# Patient Record
Sex: FEMALE | Race: WHITE | NOT HISPANIC OR LATINO | Employment: PART TIME | URBAN - METROPOLITAN AREA
[De-identification: names, ages, dates, MRNs, and addresses within clinical notes are randomized per-mention and may not be internally consistent; named-entity substitution may affect disease eponyms.]

---

## 2020-04-23 DIAGNOSIS — Z01.84 ANTIBODY RESPONSE EXAMINATION: ICD-10-CM

## 2020-05-23 DIAGNOSIS — Z01.84 ANTIBODY RESPONSE EXAMINATION: ICD-10-CM

## 2020-06-22 DIAGNOSIS — Z01.84 ANTIBODY RESPONSE EXAMINATION: ICD-10-CM

## 2020-07-22 DIAGNOSIS — Z01.84 ANTIBODY RESPONSE EXAMINATION: ICD-10-CM

## 2020-08-21 DIAGNOSIS — Z01.84 ANTIBODY RESPONSE EXAMINATION: ICD-10-CM

## 2020-09-20 DIAGNOSIS — Z01.84 ANTIBODY RESPONSE EXAMINATION: ICD-10-CM

## 2020-10-20 DIAGNOSIS — Z01.84 ANTIBODY RESPONSE EXAMINATION: ICD-10-CM

## 2020-11-19 DIAGNOSIS — Z01.84 ANTIBODY RESPONSE EXAMINATION: ICD-10-CM

## 2021-05-04 ENCOUNTER — OFFICE VISIT (OUTPATIENT)
Dept: FAMILY MEDICINE | Facility: CLINIC | Age: 51
End: 2021-05-04

## 2021-05-04 VITALS
BODY MASS INDEX: 31.99 KG/M2 | OXYGEN SATURATION: 98 % | HEIGHT: 61 IN | RESPIRATION RATE: 16 BRPM | WEIGHT: 169.44 LBS | DIASTOLIC BLOOD PRESSURE: 64 MMHG | HEART RATE: 66 BPM | SYSTOLIC BLOOD PRESSURE: 94 MMHG

## 2021-05-04 DIAGNOSIS — Z02.89 ENCOUNTER FOR PHYSICAL EXAMINATION RELATED TO EMPLOYMENT: Primary | ICD-10-CM

## 2021-05-04 PROCEDURE — 99386 PR PREVENTIVE VISIT,NEW,40-64: ICD-10-PCS | Mod: ,,, | Performed by: FAMILY MEDICINE

## 2021-05-04 PROCEDURE — 99386 PREV VISIT NEW AGE 40-64: CPT | Mod: ,,, | Performed by: FAMILY MEDICINE

## 2021-05-04 PROCEDURE — 99999 PR PBB SHADOW E&M-EST. PATIENT-LVL III: CPT | Mod: PBBFAC,,, | Performed by: FAMILY MEDICINE

## 2021-05-04 PROCEDURE — 99999 PR PBB SHADOW E&M-EST. PATIENT-LVL III: ICD-10-PCS | Mod: PBBFAC,,, | Performed by: FAMILY MEDICINE

## 2021-05-04 RX ORDER — IBUPROFEN 100 MG/5ML
1000 SUSPENSION, ORAL (FINAL DOSE FORM) ORAL DAILY
COMMUNITY

## 2021-05-04 RX ORDER — CHOLECALCIFEROL (VITAMIN D3) 25 MCG
1000 TABLET ORAL DAILY
COMMUNITY

## 2021-05-04 RX ORDER — GLUCOSAMINE SULFATE 500 MG
TABLET ORAL
COMMUNITY

## 2021-05-07 ENCOUNTER — TELEPHONE (OUTPATIENT)
Dept: FAMILY MEDICINE | Facility: CLINIC | Age: 51
End: 2021-05-07

## 2022-01-06 ENCOUNTER — TELEPHONE (OUTPATIENT)
Dept: FAMILY MEDICINE | Facility: CLINIC | Age: 52
End: 2022-01-06

## 2022-01-06 ENCOUNTER — PATIENT MESSAGE (OUTPATIENT)
Dept: FAMILY MEDICINE | Facility: CLINIC | Age: 52
End: 2022-01-06

## 2022-01-06 ENCOUNTER — OFFICE VISIT (OUTPATIENT)
Dept: FAMILY MEDICINE | Facility: CLINIC | Age: 52
End: 2022-01-06
Payer: OTHER GOVERNMENT

## 2022-01-06 VITALS
HEIGHT: 61 IN | WEIGHT: 172.94 LBS | HEART RATE: 83 BPM | OXYGEN SATURATION: 95 % | DIASTOLIC BLOOD PRESSURE: 64 MMHG | BODY MASS INDEX: 32.65 KG/M2 | SYSTOLIC BLOOD PRESSURE: 96 MMHG

## 2022-01-06 DIAGNOSIS — M79.604 RIGHT LEG PAIN: ICD-10-CM

## 2022-01-06 DIAGNOSIS — R73.9 HYPERGLYCEMIA: ICD-10-CM

## 2022-01-06 DIAGNOSIS — Z12.4 PAP SMEAR FOR CERVICAL CANCER SCREENING: ICD-10-CM

## 2022-01-06 DIAGNOSIS — R73.9 HYPERGLYCEMIA: Primary | ICD-10-CM

## 2022-01-06 DIAGNOSIS — Z00.00 ENCOUNTER FOR MEDICAL EXAMINATION TO ESTABLISH CARE: Primary | ICD-10-CM

## 2022-01-06 DIAGNOSIS — U07.1 COVID: ICD-10-CM

## 2022-01-06 LAB
CTP QC/QA: YES
CTP QC/QA: YES
FLUAV AG NPH QL: NEGATIVE
FLUBV AG NPH QL: NEGATIVE
SARS-COV-2 RDRP RESP QL NAA+PROBE: POSITIVE

## 2022-01-06 PROCEDURE — 99214 OFFICE O/P EST MOD 30 MIN: CPT | Mod: PBBFAC,PN | Performed by: FAMILY MEDICINE

## 2022-01-06 PROCEDURE — 99999 PR PBB SHADOW E&M-EST. PATIENT-LVL IV: ICD-10-PCS | Mod: PBBFAC,,, | Performed by: FAMILY MEDICINE

## 2022-01-06 PROCEDURE — 99215 PR OFFICE/OUTPT VISIT, EST, LEVL V, 40-54 MIN: ICD-10-PCS | Mod: S$PBB,,, | Performed by: FAMILY MEDICINE

## 2022-01-06 PROCEDURE — U0002 COVID-19 LAB TEST NON-CDC: HCPCS | Mod: PBBFAC,PN | Performed by: FAMILY MEDICINE

## 2022-01-06 PROCEDURE — 99215 OFFICE O/P EST HI 40 MIN: CPT | Mod: S$PBB,,, | Performed by: FAMILY MEDICINE

## 2022-01-06 PROCEDURE — 99999 PR PBB SHADOW E&M-EST. PATIENT-LVL IV: CPT | Mod: PBBFAC,,, | Performed by: FAMILY MEDICINE

## 2022-01-06 PROCEDURE — 87804 INFLUENZA ASSAY W/OPTIC: CPT | Mod: PBBFAC,PN | Performed by: FAMILY MEDICINE

## 2022-01-06 RX ORDER — LANCETS
EACH MISCELLANEOUS
Qty: 200 EACH | Refills: 1 | Status: SHIPPED | OUTPATIENT
Start: 2022-01-06

## 2022-01-06 RX ORDER — ONDANSETRON 8 MG/1
TABLET, ORALLY DISINTEGRATING ORAL
COMMUNITY
End: 2022-08-02

## 2022-01-06 RX ORDER — INSULIN PUMP SYRINGE, 3 ML
EACH MISCELLANEOUS
Qty: 1 EACH | Refills: 0 | Status: SHIPPED | OUTPATIENT
Start: 2022-01-06 | End: 2023-05-29

## 2022-01-06 RX ORDER — ALBUTEROL SULFATE 90 UG/1
AEROSOL, METERED RESPIRATORY (INHALATION)
COMMUNITY
Start: 2021-11-02

## 2022-01-06 NOTE — TELEPHONE ENCOUNTER
----- Message from Poornima Macdonald sent at 1/5/2022  4:43 PM CST -----  .Type: Needs Medical Advice  Who Called:  Pt  Symptoms (please be specific): Covid/flu like symptoms  How long has patient had these symptoms: 1/4  Best Call Back Number: [phone]   Additional Information: Pt requesting to speak with a nurse to inform that she is having some covid or flu symptoms, need to know if she can still come in to her appt.  Please call to advise.  Thanks

## 2022-01-06 NOTE — ASSESSMENT & PLAN NOTE
- COVID positive, flu negative  - staff to call patient with results, patient did not want to wait for results in office  - discussed quarantine protocol with patient before she left office if COVID positive

## 2022-01-06 NOTE — PROGRESS NOTES
Subjective:       Patient ID: Joselin Steinberg is a 51 y.o. female.    Chief Complaint: Fatigue and Generalized Body Aches    50 y/o F with PMH of Asthma, gastric bypass, and OA presents to establish care, patient does not have a PCP. Patient is new to me today. Patient complains of body aches, fatigue, congestion, sore throat, fever of 103.7 two days ago and repeat of 100.7 for which she took ibuprofen. Patient works as a nurse. States she thinks she has COVID and would like to undergo a flu and COVID test today. Patient would also like labs drawn and to be checked for diabetes as she can feel when her blood sugar is too high and she starts to have headaches and blurry vision, she has been diabetic in the past but does not want to take medication at this time. Has a glucometer at home and she checks her glucose with ranges of 117-307, requested glucometer strips today. She is motivated to control it with diet and exercise. Currently does not exercise states she eats increased proteins and salads but sweet tea is her downfall and she is trying to stop drinking it. Patient also states she thinks she has a bakers cyst behind her right knee and would like a d-dimer and ultrasound for DVT checked. Patient denies COVID, flu and shingles vaccine today. States she underwent colonoscopy 1.5 years ago with return in 10 years. Patient is requesting referral to OBGYN and hormone testing today stating that she knows she is going through menopause, her periods are lasting 2.5 weeks. Patient has not undergone PAP smear in >5 years, mammogram was performed last year. Patient would also like us to check her pancrease and she states she is having mid epigastric pain that radiates to her left side under her rib and she feels it is her pancreas. Patient does not have a gallbladder. She would also like her cholesterol checked. Discussed we will additionally perform basic labs and care gap labs today.        Current Outpatient Medications:      albuterol (PROVENTIL/VENTOLIN HFA) 90 mcg/actuation inhaler, , Disp: , Rfl:     ascorbic acid, vitamin C, (VITAMIN C) 1000 MG tablet, Take 1,000 mg by mouth once daily., Disp: , Rfl:     lysine 1,000 mg Tab, Take by mouth., Disp: , Rfl:     multivitamin with minerals tablet, , Disp: , Rfl:     QUERCETIN DIHYDRATE, BULK, MISC, by Misc.(Non-Drug; Combo Route) route., Disp: , Rfl:     vitamin D (VITAMIN D3) 1000 units Tab, Take 1,000 Units by mouth once daily., Disp: , Rfl:     zinc acetate 25 mg (zinc) Cap, Take by mouth., Disp: , Rfl:     blood sugar diagnostic Strp, 1 strip by Misc.(Non-Drug; Combo Route) route 2 (two) times daily., Disp: 200 strip, Rfl: 1    ondansetron (ZOFRAN-ODT) 8 MG TbDL, Zofran ODT 8 mg disintegrating tablet  Place 1 tablet every 8 hours by translingual route., Disp: , Rfl:     Review of patient's allergies indicates:  No Known Allergies    Past Medical History:   Diagnosis Date    Asthma        Past Surgical History:   Procedure Laterality Date     SECTION  1996    CHOLECYSTECTOMY      GASTRIC BYPASS  03/10/2010    TUBAL LIGATION  05/15/1996    Approximately       Family History   Problem Relation Age of Onset    Alcohol abuse Maternal Grandmother     Alcohol abuse Brother     Asthma Son     Diabetes Mother     Early death Mother     Lung cancer Mother     Drug abuse Brother        Social History     Tobacco Use    Smoking status: Never Smoker   Substance Use Topics    Alcohol use: Never    Drug use: Never       Review of Systems   Constitutional: Positive for fatigue and fever. Negative for activity change, appetite change and unexpected weight change.   HENT: Positive for nasal congestion and sore throat. Negative for ear discharge, ear pain, hearing loss and tinnitus.    Eyes: Negative for photophobia, pain and visual disturbance.   Respiratory: Negative for cough, shortness of breath and wheezing.    Cardiovascular: Negative for chest pain and  "palpitations.   Gastrointestinal: Positive for abdominal pain (middle of chest to left side). Negative for blood in stool, constipation, diarrhea, nausea and vomiting.   Genitourinary: Positive for menstrual irregularity. Negative for dysuria and hematuria.   Musculoskeletal: Positive for myalgias.        "bakers cyst" at back of right leg   Neurological: Positive for headaches. Negative for weakness.           Objective:      Vitals:    01/06/22 1107   BP: 96/64   BP Location: Right arm   Patient Position: Sitting   BP Method: Medium (Automatic)   Pulse: 83   SpO2: 95%   Weight: 78.4 kg (172 lb 15.2 oz)   Height: 5' 1" (1.549 m)     Physical Exam  Constitutional:       Appearance: Normal appearance.   HENT:      Head: Normocephalic.      Right Ear: Tympanic membrane, ear canal and external ear normal.      Left Ear: Tympanic membrane, ear canal and external ear normal.      Nose: Nose normal.      Mouth/Throat:      Mouth: Mucous membranes are moist.      Pharynx: Oropharynx is clear. No oropharyngeal exudate or posterior oropharyngeal erythema.   Eyes:      Pupils: Pupils are equal, round, and reactive to light.   Cardiovascular:      Rate and Rhythm: Normal rate and regular rhythm.      Pulses: Normal pulses.      Heart sounds: Normal heart sounds.   Pulmonary:      Effort: Pulmonary effort is normal.      Breath sounds: Normal breath sounds.   Abdominal:      General: Bowel sounds are normal.      Palpations: Abdomen is soft.      Tenderness: There is no abdominal tenderness (no tenderness to palpation noted in midepigastric region/left upperquadrant). There is no guarding or rebound.   Musculoskeletal:         General: Normal range of motion.      Cervical back: Normal range of motion and neck supple. No tenderness.      Comments: No bakers cyst noted a right popliteal fossa   Lymphadenopathy:      Cervical: No cervical adenopathy.   Skin:     General: Skin is warm and dry.   Neurological:      General: No " focal deficit present.      Mental Status: She is alert and oriented to person, place, and time.   Psychiatric:         Mood and Affect: Mood normal.         Behavior: Behavior normal.           No results found for: WBC, HGB, HCT, PLT, CHOL, TRIG, HDL, LDLDIRECT, ALT, AST, NA, K, CL, CREATININE, BUN, CO2, TSH, PSA, INR, GLUF, HGBA1C, MICROALBUR   Assessment:       1. Encounter for medical examination to establish care    2. Pap smear for cervical cancer screening    3. Right leg pain    4. COVID    5. Hyperglycemia        Plan:         Problem List Items Addressed This Visit        Renal/    Pap smear for cervical cancer screening    Relevant Orders    Ambulatory referral/consult to Obstetrics / Gynecology    Ambulatory referral/consult to Obstetrics / Gynecology       Endocrine    Hyperglycemia     - continue to monitor blood glucose at home  - implement strict diet and exercise regimen over the next three months  -150 minutes/week of exercise  - will recheck labs in 3 months if no improvement medication may be warranted          Relevant Medications    blood sugar diagnostic Strp       Orthopedic    Right leg pain    Relevant Orders    D dimer, quantitative    CV Ultrasound doppler venous legs bilat       Other    COVID     - COVID positive, flu negative  - staff to call patient with results, patient did not want to wait for results in office  - discussed quarantine protocol with patient before she left office if COVID positive         Encounter for medical examination to establish care - Primary     - multiple lab requests per patient, will call with lab results           Relevant Orders    CBC Auto Differential    Comprehensive Metabolic Panel    TSH    Hemoglobin A1C    POCT COVID-19 Rapid Screening    POCT Influenza A/B    Lipid Panel    Hepatitis C Antibody    HIV 1/2 Ag/Ab (4th Gen)    Follicle Stimulating Hormone    Luteinizing Hormone    CALCITRIOL    Magnesium    Ambulatory referral/consult to  Obstetrics / Gynecology    Lipase    Amylase            Follow up in about 1 month (around 2/6/2022), or if symptoms worsen or fail to improve.   Patient expressed verbal agreement and understanding to this plan of care.     JR Talbot MD

## 2022-01-06 NOTE — ASSESSMENT & PLAN NOTE
- continue to monitor blood glucose at home  - implement strict diet and exercise regimen over the next three months  -150 minutes/week of exercise  - will recheck labs in 3 months if no improvement medication may be warranted

## 2022-01-10 ENCOUNTER — LAB VISIT (OUTPATIENT)
Dept: FAMILY MEDICINE | Facility: CLINIC | Age: 52
End: 2022-01-10
Payer: OTHER GOVERNMENT

## 2022-01-10 DIAGNOSIS — Z00.00 ENCOUNTER FOR MEDICAL EXAMINATION TO ESTABLISH CARE: ICD-10-CM

## 2022-01-10 LAB
ALBUMIN SERPL BCP-MCNC: 3.7 G/DL (ref 3.5–5.2)
ALP SERPL-CCNC: 63 U/L (ref 55–135)
ALT SERPL W/O P-5'-P-CCNC: 21 U/L (ref 10–44)
AMYLASE SERPL-CCNC: 65 U/L (ref 20–110)
ANION GAP SERPL CALC-SCNC: 11 MMOL/L (ref 8–16)
AST SERPL-CCNC: 24 U/L (ref 10–40)
BASOPHILS # BLD AUTO: 0.01 K/UL (ref 0–0.2)
BASOPHILS NFR BLD: 0.4 % (ref 0–1.9)
BILIRUB SERPL-MCNC: 0.3 MG/DL (ref 0.1–1)
BUN SERPL-MCNC: 7 MG/DL (ref 6–20)
CALCIUM SERPL-MCNC: 8.6 MG/DL (ref 8.7–10.5)
CHLORIDE SERPL-SCNC: 105 MMOL/L (ref 95–110)
CHOLEST SERPL-MCNC: 119 MG/DL (ref 120–199)
CHOLEST/HDLC SERPL: 4.3 {RATIO} (ref 2–5)
CO2 SERPL-SCNC: 23 MMOL/L (ref 23–29)
CREAT SERPL-MCNC: 0.8 MG/DL (ref 0.5–1.4)
DIFFERENTIAL METHOD: ABNORMAL
EOSINOPHIL # BLD AUTO: 0.1 K/UL (ref 0–0.5)
EOSINOPHIL NFR BLD: 2.9 % (ref 0–8)
ERYTHROCYTE [DISTWIDTH] IN BLOOD BY AUTOMATED COUNT: 16.4 % (ref 11.5–14.5)
EST. GFR  (AFRICAN AMERICAN): >60 ML/MIN/1.73 M^2
EST. GFR  (NON AFRICAN AMERICAN): >60 ML/MIN/1.73 M^2
ESTIMATED AVG GLUCOSE: 111 MG/DL (ref 68–131)
FSH SERPL-ACNC: 6.17 MIU/ML
GLUCOSE SERPL-MCNC: 92 MG/DL (ref 70–110)
HBA1C MFR BLD: 5.5 % (ref 4–5.6)
HCT VFR BLD AUTO: 32.7 % (ref 37–48.5)
HDLC SERPL-MCNC: 28 MG/DL (ref 40–75)
HDLC SERPL: 23.5 % (ref 20–50)
HGB BLD-MCNC: 9.4 G/DL (ref 12–16)
IMM GRANULOCYTES # BLD AUTO: 0.01 K/UL (ref 0–0.04)
IMM GRANULOCYTES NFR BLD AUTO: 0.4 % (ref 0–0.5)
LDLC SERPL CALC-MCNC: 42.8 MG/DL (ref 63–159)
LH SERPL-ACNC: 6.8 MIU/ML
LIPASE SERPL-CCNC: 28 U/L (ref 4–60)
LYMPHOCYTES # BLD AUTO: 1.1 K/UL (ref 1–4.8)
LYMPHOCYTES NFR BLD: 46.6 % (ref 18–48)
MAGNESIUM SERPL-MCNC: 2.1 MG/DL (ref 1.6–2.6)
MCH RBC QN AUTO: 19.3 PG (ref 27–31)
MCHC RBC AUTO-ENTMCNC: 28.7 G/DL (ref 32–36)
MCV RBC AUTO: 67 FL (ref 82–98)
MONOCYTES # BLD AUTO: 0.2 K/UL (ref 0.3–1)
MONOCYTES NFR BLD: 8.8 % (ref 4–15)
NEUTROPHILS # BLD AUTO: 1 K/UL (ref 1.8–7.7)
NEUTROPHILS NFR BLD: 40.9 % (ref 38–73)
NONHDLC SERPL-MCNC: 91 MG/DL
NRBC BLD-RTO: 0 /100 WBC
PLATELET # BLD AUTO: 159 K/UL (ref 150–450)
PMV BLD AUTO: 11.3 FL (ref 9.2–12.9)
POTASSIUM SERPL-SCNC: 4 MMOL/L (ref 3.5–5.1)
PROT SERPL-MCNC: 6.9 G/DL (ref 6–8.4)
RBC # BLD AUTO: 4.87 M/UL (ref 4–5.4)
SODIUM SERPL-SCNC: 139 MMOL/L (ref 136–145)
TRIGL SERPL-MCNC: 241 MG/DL (ref 30–150)
TSH SERPL DL<=0.005 MIU/L-ACNC: 2.74 UIU/ML (ref 0.4–4)
WBC # BLD AUTO: 2.38 K/UL (ref 3.9–12.7)

## 2022-01-10 PROCEDURE — 83735 ASSAY OF MAGNESIUM: CPT | Performed by: FAMILY MEDICINE

## 2022-01-10 PROCEDURE — 85025 COMPLETE CBC W/AUTO DIFF WBC: CPT | Performed by: FAMILY MEDICINE

## 2022-01-10 PROCEDURE — 80053 COMPREHEN METABOLIC PANEL: CPT | Performed by: FAMILY MEDICINE

## 2022-01-10 PROCEDURE — 82150 ASSAY OF AMYLASE: CPT | Performed by: FAMILY MEDICINE

## 2022-01-10 PROCEDURE — 83001 ASSAY OF GONADOTROPIN (FSH): CPT | Performed by: FAMILY MEDICINE

## 2022-01-10 PROCEDURE — 83002 ASSAY OF GONADOTROPIN (LH): CPT | Performed by: FAMILY MEDICINE

## 2022-01-10 PROCEDURE — 84443 ASSAY THYROID STIM HORMONE: CPT | Performed by: FAMILY MEDICINE

## 2022-01-10 PROCEDURE — 86803 HEPATITIS C AB TEST: CPT | Performed by: FAMILY MEDICINE

## 2022-01-10 PROCEDURE — 83690 ASSAY OF LIPASE: CPT | Performed by: FAMILY MEDICINE

## 2022-01-10 PROCEDURE — 87389 HIV-1 AG W/HIV-1&-2 AB AG IA: CPT | Performed by: FAMILY MEDICINE

## 2022-01-10 PROCEDURE — 83036 HEMOGLOBIN GLYCOSYLATED A1C: CPT | Performed by: FAMILY MEDICINE

## 2022-01-10 PROCEDURE — 80061 LIPID PANEL: CPT | Performed by: FAMILY MEDICINE

## 2022-01-11 LAB
HCV AB SERPL QL IA: NEGATIVE
HIV 1+2 AB+HIV1 P24 AG SERPL QL IA: NEGATIVE

## 2022-01-11 NOTE — PROGRESS NOTES
Please notify patient that all lab results were normal except her CBC and lipid panel. She is anemic and I would like her to take OTC Ferrous Sulfate 325mg PO QD and I will recheck values in three months. Please caution her about constipation with iron supplementation and to obtain Miralax if needed. Her triglycerides were extremely elevated she needs to implement diet and exercise regimen. Recommend the mediterranean diet and 150 minutes of moderate intensity exercise per week. I would also like to recheck these values in one month. Thank you.

## 2022-01-12 ENCOUNTER — TELEPHONE (OUTPATIENT)
Dept: FAMILY MEDICINE | Facility: CLINIC | Age: 52
End: 2022-01-12
Payer: OTHER GOVERNMENT

## 2022-01-12 NOTE — TELEPHONE ENCOUNTER
----- Message from JR Talbot MD sent at 1/11/2022  1:25 PM CST -----  Please notify patient that all lab results were normal except her CBC and lipid panel. She is anemic and I would like her to take OTC Ferrous Sulfate 325mg PO QD and I will recheck values in three months. Please caution her about constipation with ir  on supplementation and to obtain Miralax if needed. Her triglycerides were extremely elevated she needs to implement diet and exercise regimen. Recommend the mediterranean diet and 150 minutes of moderate intensity exercise per week. I would also lik  e to recheck these values in one month. Thank you.

## 2022-01-12 NOTE — LETTER
"February 17, 2022    Joselin Vossd  [id number]  [place] [address]             [place] [facility]  [address] [place] [address]  Phone: [phone]        Dr Ana Brar reviewed your labs and she said "Please notify patient that all lab results were normal except her CBC and lipid panel. She is anemic and I would like her to take OTC Ferrous Sulfate 325mg PO QD and I will recheck values in three months. Please caution her about constipation with ir  on supplementation and to obtain Miralax if needed. Her triglycerides were extremely elevated she needs to implement diet and exercise regimen. Recommend the mediterranean diet and 150 minutes of moderate intensity exercise per week. I would also lik  e to recheck these values in one month."  Please contact us with any questions or concerns you may have.      Thank you for choosing [facility]!  Have a great day!  Lucy Evans LPN  [facility]  [address]  [place]  [phone]        "

## 2022-04-11 PROBLEM — Z00.00 ENCOUNTER FOR MEDICAL EXAMINATION TO ESTABLISH CARE: Status: RESOLVED | Noted: 2022-01-06 | Resolved: 2022-04-11

## 2022-08-02 ENCOUNTER — OFFICE VISIT (OUTPATIENT)
Dept: OBSTETRICS AND GYNECOLOGY | Facility: CLINIC | Age: 52
End: 2022-08-02
Payer: OTHER GOVERNMENT

## 2022-08-02 VITALS
WEIGHT: 166 LBS | HEIGHT: 61 IN | BODY MASS INDEX: 31.34 KG/M2 | DIASTOLIC BLOOD PRESSURE: 66 MMHG | SYSTOLIC BLOOD PRESSURE: 110 MMHG

## 2022-08-02 DIAGNOSIS — R23.3: Primary | ICD-10-CM

## 2022-08-02 PROCEDURE — 99203 PR OFFICE/OUTPT VISIT, NEW, LEVL III, 30-44 MIN: ICD-10-PCS | Mod: S$GLB,,, | Performed by: OBSTETRICS & GYNECOLOGY

## 2022-08-02 PROCEDURE — 99203 OFFICE O/P NEW LOW 30 MIN: CPT | Mod: S$GLB,,, | Performed by: OBSTETRICS & GYNECOLOGY

## 2022-08-02 NOTE — PROGRESS NOTES
Subjective:       Patient ID: Joselin Steinberg is a 52 y.o. female.    Chief Complaint: Gynecologic Exam (Patient is here for black bumps on labia.)  Pt disc bumps while bathing and is worried that there might be CoAC-h/o HPV in years past but no Pap in about 6 yrs, no new partners, no abnl D/C, no itching  HPI  Review of Systems   Genitourinary: Negative for vaginal dryness.         Objective:      Physical Exam  Vitals and nursing note reviewed. Exam conducted with a chaperone present.   Constitutional:       Appearance: Normal appearance. She is normal weight.   HENT:      Head: Normocephalic and atraumatic.   Genitourinary:     General: Normal vulva.      Exam position: Lithotomy position.          Comments: Multiple petechiae, more on left labia majora-and plugged pores-no abnl growths, no excoriation, normal pattern of melanin  Musculoskeletal:      Cervical back: Normal range of motion.   Neurological:      Mental Status: She is alert.         Assessment:       Problem List Items Addressed This Visit    None         Plan:         Perifollicular petechiae of skin      Reassured pt of normal finding  RTc for Pap and annual

## 2022-08-11 ENCOUNTER — OFFICE VISIT (OUTPATIENT)
Dept: OBSTETRICS AND GYNECOLOGY | Facility: CLINIC | Age: 52
End: 2022-08-11
Payer: OTHER GOVERNMENT

## 2022-08-11 VITALS
HEIGHT: 61 IN | WEIGHT: 169.38 LBS | SYSTOLIC BLOOD PRESSURE: 110 MMHG | BODY MASS INDEX: 31.98 KG/M2 | DIASTOLIC BLOOD PRESSURE: 64 MMHG

## 2022-08-11 DIAGNOSIS — Z12.31 SCREENING MAMMOGRAM FOR BREAST CANCER: ICD-10-CM

## 2022-08-11 DIAGNOSIS — Z01.419 WOMEN'S ANNUAL ROUTINE GYNECOLOGICAL EXAMINATION: Primary | ICD-10-CM

## 2022-08-11 PROCEDURE — 88175 CYTOPATH C/V AUTO FLUID REDO: CPT | Performed by: OBSTETRICS & GYNECOLOGY

## 2022-08-11 PROCEDURE — 99396 PR PREVENTIVE VISIT,EST,40-64: ICD-10-PCS | Mod: S$GLB,,, | Performed by: OBSTETRICS & GYNECOLOGY

## 2022-08-11 PROCEDURE — 99396 PREV VISIT EST AGE 40-64: CPT | Mod: S$GLB,,, | Performed by: OBSTETRICS & GYNECOLOGY

## 2022-08-11 PROCEDURE — 87624 HPV HI-RISK TYP POOLED RSLT: CPT | Performed by: OBSTETRICS & GYNECOLOGY

## 2022-08-11 NOTE — PROGRESS NOTES
Subjective:       Patient ID: Joselin Steinberg is a 52 y.o. female.    Chief Complaint: Well Woman  No complaints - some change in her menses-a little shorter and some HF and nt sweats at times  Had a Oxana-en-Y 11 yrs ago and has done well, followed by a PCP and has occ bouts of extreme hypoglycemia and occ hyperglycemia-said her A1C was normal  No FMH of BRCA or OVCA, had a colonoscopy about a yr ago  Had a LEEP and Paps have been normal since-no Pap in 6 yrs  HPI  Review of Systems   Genitourinary: Positive for hot flashes. Negative for vaginal pain and vaginal dryness.         Objective:      Physical Exam  Vitals and nursing note reviewed. Exam conducted with a chaperone present.   Constitutional:       Appearance: Normal appearance.   HENT:      Head: Normocephalic and atraumatic.   Cardiovascular:      Rate and Rhythm: Normal rate and regular rhythm.      Heart sounds: Normal heart sounds.   Pulmonary:      Effort: Pulmonary effort is normal.      Breath sounds: Normal breath sounds.   Chest:   Breasts:      Right: Normal.      Left: Normal.       Genitourinary:     General: Normal vulva.      Exam position: Lithotomy position.      Vagina: Normal.      Uterus: Normal.       Adnexa: Right adnexa normal and left adnexa normal.      Comments: Petechiae of the vulva  Os is irregularly shaped due to LEEP  Musculoskeletal:      Cervical back: Normal range of motion and neck supple.   Neurological:      Mental Status: She is alert.         Assessment:       Problem List Items Addressed This Visit    None         Plan:       Women's annual routine gynecological examination    Screening mammogram for breast cancer    Disc menopause and Paps  PAP and MMG ordered

## 2022-08-17 ENCOUNTER — TELEPHONE (OUTPATIENT)
Dept: OBSTETRICS AND GYNECOLOGY | Facility: CLINIC | Age: 52
End: 2022-08-17
Payer: OTHER GOVERNMENT

## 2022-08-17 LAB
FINAL PATHOLOGIC DIAGNOSIS: NORMAL
HPV HR 12 DNA SPEC QL NAA+PROBE: NEGATIVE
HPV16 AG SPEC QL: NEGATIVE
HPV18 DNA SPEC QL NAA+PROBE: NEGATIVE
Lab: NORMAL

## 2022-08-17 NOTE — TELEPHONE ENCOUNTER
Called pt no answer, message left.    ----- Message from David Leigh MD sent at 8/17/2022 10:48 AM CDT -----  Pap and HPV are normal  Dr Leigh

## 2022-08-24 ENCOUNTER — HOSPITAL ENCOUNTER (OUTPATIENT)
Dept: RADIOLOGY | Facility: HOSPITAL | Age: 52
Discharge: HOME | End: 2022-08-24
Attending: OBSTETRICS & GYNECOLOGY
Payer: OTHER GOVERNMENT

## 2022-08-24 DIAGNOSIS — Z12.31 SCREENING MAMMOGRAM FOR BREAST CANCER: ICD-10-CM

## 2022-08-24 DIAGNOSIS — Z01.419 WOMEN'S ANNUAL ROUTINE GYNECOLOGICAL EXAMINATION: ICD-10-CM

## 2022-08-24 PROCEDURE — 77063 BREAST TOMOSYNTHESIS BI: CPT | Mod: 26,,, | Performed by: RADIOLOGY

## 2022-08-24 PROCEDURE — 77063 BREAST TOMOSYNTHESIS BI: CPT | Mod: TC

## 2022-08-24 PROCEDURE — 77067 SCR MAMMO BI INCL CAD: CPT | Mod: 26,,, | Performed by: RADIOLOGY

## 2022-08-24 PROCEDURE — 77067 MAMMO DIGITAL SCREENING BILAT WITH TOMO: ICD-10-PCS | Mod: 26,,, | Performed by: RADIOLOGY

## 2022-08-24 PROCEDURE — 77063 MAMMO DIGITAL SCREENING BILAT WITH TOMO: ICD-10-PCS | Mod: 26,,, | Performed by: RADIOLOGY

## 2023-01-19 ENCOUNTER — OCCUPATIONAL HEALTH (OUTPATIENT)
Dept: URGENT CARE | Facility: CLINIC | Age: 53
End: 2023-01-19

## 2023-01-19 VITALS — HEIGHT: 61 IN | WEIGHT: 164 LBS | BODY MASS INDEX: 30.96 KG/M2

## 2023-01-19 PROCEDURE — 90715 TDAP VACCINE 7 YRS/> IM: CPT | Mod: ,,, | Performed by: NURSE PRACTITIONER

## 2023-01-19 PROCEDURE — 90715 TDAP VACCINE GREATER THAN OR EQUAL TO 7YO IM: ICD-10-PCS | Mod: ,,, | Performed by: NURSE PRACTITIONER

## 2023-01-19 NOTE — PROGRESS NOTES
"Subjective:       Patient ID: Joselin Steinberg is a 52 y.o. female.    Vitals:  height is 5' 1" (1.549 m) and weight is 74.4 kg (164 lb).     Chief Complaint: Immunizations    HPI  ROS    Objective:      Physical Exam      Assessment:       No diagnosis found.      Plan:         There are no diagnoses linked to this encounter.                 "

## 2023-04-28 ENCOUNTER — OFFICE VISIT (OUTPATIENT)
Dept: PRIMARY CARE CLINIC | Facility: CLINIC | Age: 53
End: 2023-04-28
Payer: OTHER GOVERNMENT

## 2023-04-28 ENCOUNTER — PATIENT MESSAGE (OUTPATIENT)
Dept: PRIMARY CARE CLINIC | Facility: CLINIC | Age: 53
End: 2023-04-28

## 2023-04-28 VITALS
BODY MASS INDEX: 30.4 KG/M2 | DIASTOLIC BLOOD PRESSURE: 60 MMHG | HEART RATE: 72 BPM | OXYGEN SATURATION: 98 % | WEIGHT: 161 LBS | HEIGHT: 61 IN | TEMPERATURE: 98 F | RESPIRATION RATE: 18 BRPM | SYSTOLIC BLOOD PRESSURE: 114 MMHG

## 2023-04-28 DIAGNOSIS — D64.9 ANEMIA, UNSPECIFIED TYPE: ICD-10-CM

## 2023-04-28 DIAGNOSIS — M54.50 LUMBAR BACK PAIN: ICD-10-CM

## 2023-04-28 DIAGNOSIS — Z13.820 ENCOUNTER FOR SCREENING FOR OSTEOPOROSIS: ICD-10-CM

## 2023-04-28 DIAGNOSIS — R13.10 ODYNOPHAGIA: Primary | ICD-10-CM

## 2023-04-28 PROCEDURE — 99215 OFFICE O/P EST HI 40 MIN: CPT | Mod: S$GLB,,, | Performed by: FAMILY MEDICINE

## 2023-04-28 PROCEDURE — 99215 PR OFFICE/OUTPT VISIT, EST, LEVL V, 40-54 MIN: ICD-10-PCS | Mod: S$GLB,,, | Performed by: FAMILY MEDICINE

## 2023-04-28 NOTE — ASSESSMENT & PLAN NOTE
- concern for extended timeframe that patient has experienced pain swallowing, will refer to GI today for possible EGD and further workup

## 2023-04-28 NOTE — ASSESSMENT & PLAN NOTE
- anemia panel today given extensive history of menstrual bleeding as well as previous CBC readings  - also recommend following up with OBGYN

## 2023-04-28 NOTE — PROGRESS NOTES
Subjective:       Patient ID: Joselin Steinberg is a 53 y.o. female.    Chief Complaint: Back Pain (1 year increasing ) and Referral (gastro)    53-year-old female presents to clinic for follow-up.  Patient states for approximately the past 8 months her throat has been hurting.  She was previously diagnosed with COVID at her last visit.  Patient states it feels like her throat is burning, hurts to swallow.  Patient denies it being heartburn as it does not occur after meals or when she eats/lays down at night.  Patient states it hurts to swallow both liquids and solids.  Patient has previous history of Oxana-en-Y surgery.  Patient states her appetite has decreased due to difficulty with swallowing, and she has noticed weight loss.  Patient states she has nausea every now and then but denies any vomiting, diarrhea, constipation.  States all food gives her pain.  Patient states she had a colonoscopy in [place] approximately 10 years ago she is probably due soon.  Patient would like to be referred to GI, will place referral today.  Discussed H pylori testing but that we do not have it in office, will defer to GI in case there is further testing needed.  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  Today she states that the pain is a 3/10 states ibuprofen makes it better, lifting makes it worse.  Patient states she is cautious about over-the-counter pain medications due to her Oxana-en-Y.  Patient states in December of last year she had an extremely heavy period, then her periods ceased, and now she is having a period again.  She is concerned about menopause and extreme bleeding and debilitating pain, discussed with her like her to follow up OBGYN.  She is also concerned about osteoporosis given symptoms, would like DEXA scan done well today.  She is known to have anemia in the past, will perform anemia panel today.  No further  complaints noted today.        Current Outpatient Medications:     albuterol (PROVENTIL/VENTOLIN HFA) 90 mcg/actuation inhaler, , Disp: , Rfl:     ascorbic acid, vitamin C, (VITAMIN C) 1000 MG tablet, Take 1,000 mg by mouth once daily., Disp: , Rfl:     blood sugar diagnostic Strp, To check BG 2 times daily, to use with insurance preferred meter, Disp: 200 strip, Rfl: 2    blood-glucose meter kit, To check BG 2 times daily, to use with insurance preferred meter, Disp: 1 each, Rfl: 0    lancets Misc, To check BG 2 times daily, to use with insurance preferred meter, Disp: 200 each, Rfl: 1    lysine 1,000 mg Tab, Take by mouth., Disp: , Rfl:     multivitamin with minerals tablet, , Disp: , Rfl:     QUERCETIN DIHYDRATE, BULK, MISC, by Misc.(Non-Drug; Combo Route) route., Disp: , Rfl:     vitamin D (VITAMIN D3) 1000 units Tab, Take 1,000 Units by mouth once daily., Disp: , Rfl:     zinc acetate 25 mg (zinc) Cap, Take by mouth., Disp: , Rfl:     Review of patient's allergies indicates:  No Known Allergies    Past Medical History:   Diagnosis Date    Asthma        Past Surgical History:   Procedure Laterality Date    BREAST BIOPSY       SECTION  1996    CHOLECYSTECTOMY      GASTRIC BYPASS  03/10/2010    TUBAL LIGATION  05/15/1996    Approximately       Family History   Problem Relation Age of Onset    Alcohol abuse Maternal Grandmother     Alcohol abuse Brother     Asthma Son     Diabetes Mother     Early death Mother     Lung cancer Mother     Drug abuse Brother        Social History     Tobacco Use    Smoking status: Never    Smokeless tobacco: Never   Substance Use Topics    Alcohol use: Never    Drug use: Never       Review of Systems   Constitutional:  Positive for appetite change. Negative for activity change, fatigue, fever and unexpected weight change.   HENT:  Negative for ear discharge, ear pain, hearing loss and tinnitus.    Eyes:  Negative for photophobia, pain and visual disturbance.  "  Respiratory:  Negative for cough, shortness of breath and wheezing.    Cardiovascular:  Negative for chest pain and palpitations.   Gastrointestinal:  Negative for abdominal pain, blood in stool, constipation, diarrhea, nausea and vomiting.        Difficulty swallowing   Genitourinary:  Positive for menstrual irregularity and vaginal bleeding. Negative for dysuria and hematuria.   Musculoskeletal:  Positive for back pain.   Neurological:  Negative for weakness and headaches.       Current Medications:   Home Psychiatric Meds:   Psychotherapeutics (From admission, onward)      None                Objective:      Vitals:    04/28/23 0805   BP: 114/60   BP Location: Left arm   Patient Position: Sitting   BP Method: Medium (Manual)   Pulse: 72   Resp: 18   Temp: 98.3 °F (36.8 °C)   TempSrc: Oral   SpO2: 98%   Weight: 73 kg (161 lb)   Height: 5' 1" (1.549 m)     Physical Exam  Vitals reviewed.   Constitutional:       Appearance: Normal appearance.   HENT:      Head: Normocephalic.      Right Ear: Tympanic membrane, ear canal and external ear normal.      Left Ear: Tympanic membrane, ear canal and external ear normal.      Nose: Nose normal.      Mouth/Throat:      Mouth: Mucous membranes are moist.   Eyes:      Pupils: Pupils are equal, round, and reactive to light.   Cardiovascular:      Rate and Rhythm: Normal rate and regular rhythm.      Pulses: Normal pulses.      Heart sounds: Normal heart sounds.   Pulmonary:      Effort: Pulmonary effort is normal.      Breath sounds: Normal breath sounds.   Abdominal:      General: Bowel sounds are normal.      Palpations: Abdomen is soft.   Musculoskeletal:         General: Normal range of motion.      Cervical back: Normal range of motion and neck supple.      Lumbar back: Tenderness present.        Back:    Skin:     General: Skin is warm and dry.   Neurological:      General: No focal deficit present.      Mental Status: She is alert and oriented to person, place, and " time.   Psychiatric:         Mood and Affect: Mood normal.         Behavior: Behavior normal.         Lab Results   Component Value Date    WBC 2.38 (L) 01/10/2022    HGB 9.4 (L) 01/10/2022    HCT 32.7 (L) 01/10/2022     01/10/2022    CHOL 119 (L) 01/10/2022    TRIG 241 (H) 01/10/2022    HDL 28 (L) 01/10/2022    ALT 21 01/10/2022    AST 24 01/10/2022     01/10/2022    K 4.0 01/10/2022     01/10/2022    CREATININE 0.8 01/10/2022    BUN 7 01/10/2022    CO2 23 01/10/2022    TSH 2.736 01/10/2022    HGBA1C 5.5 01/10/2022      Assessment:       1. Odynophagia    2. Lumbar back pain    3. Encounter for screening for osteoporosis    4. Anemia, unspecified type        Plan:         Problem List Items Addressed This Visit          Oncology    Anemia     - anemia panel today given extensive history of menstrual bleeding as well as previous CBC readings  - also recommend following up with OBGYN           Relevant Orders    CBC Auto Differential    Iron and TIBC    Ferritin    Folate    Vitamin B12    Reticulocytes       GI    Odynophagia - Primary     - concern for extended timeframe that patient has experienced pain swallowing, will refer to GI today for possible EGD and further workup           Relevant Orders    Ambulatory referral/consult to Gastroenterology       Orthopedic    Encounter for screening for osteoporosis    Relevant Orders    DXA Bone Density Axial Skeleton 1 or more sites    Lumbar back pain    Relevant Orders    X-Ray Lumbar Spine AP And Lateral         Follow up in about 1 month (around 5/28/2023).        Approximately 54 minutes were spent on this encounter. This includes face to face time and non-face to face time preparing to see the patient (eg, review of tests), obtaining and/or reviewing separately obtained history, documenting clinical information in the electronic or other health record, independently interpreting results and communicating results to the patient/family/caregiver,  or care coordinator.    Instructed patient that if symptoms fail to improve or worsen patient should seek immediate medical attention or report to the nearest emergency department. Patient expressed verbal agreement and understanding to this plan of care.     This note was created using [employer] voice recognition software that occasionally misinterpreted phrases or words.     JR Talbot MD

## 2023-05-05 ENCOUNTER — HOSPITAL ENCOUNTER (OUTPATIENT)
Dept: RADIOLOGY | Facility: HOSPITAL | Age: 53
Discharge: HOME | End: 2023-05-05
Attending: FAMILY MEDICINE
Payer: OTHER GOVERNMENT

## 2023-05-05 DIAGNOSIS — Z13.820 ENCOUNTER FOR SCREENING FOR OSTEOPOROSIS: ICD-10-CM

## 2023-05-05 DIAGNOSIS — M54.50 LUMBAR BACK PAIN: ICD-10-CM

## 2023-05-05 PROCEDURE — 77080 DXA BONE DENSITY AXIAL: CPT | Mod: 26,,, | Performed by: RADIOLOGY

## 2023-05-05 PROCEDURE — 72100 XR LUMBAR SPINE AP AND LATERAL: ICD-10-PCS | Mod: 26,,, | Performed by: RADIOLOGY

## 2023-05-05 PROCEDURE — 72100 X-RAY EXAM L-S SPINE 2/3 VWS: CPT | Mod: 26,,, | Performed by: RADIOLOGY

## 2023-05-05 PROCEDURE — 72100 X-RAY EXAM L-S SPINE 2/3 VWS: CPT | Mod: TC

## 2023-05-05 PROCEDURE — 77080 DXA BONE DENSITY AXIAL: CPT | Mod: TC

## 2023-05-05 PROCEDURE — 77080 DXA BONE DENSITY AXIAL SKELETON 1 OR MORE SITES: ICD-10-PCS | Mod: 26,,, | Performed by: RADIOLOGY

## 2023-05-08 ENCOUNTER — PATIENT MESSAGE (OUTPATIENT)
Dept: PRIMARY CARE CLINIC | Facility: CLINIC | Age: 53
End: 2023-05-08
Payer: OTHER GOVERNMENT

## 2023-05-09 ENCOUNTER — TELEPHONE (OUTPATIENT)
Dept: FAMILY MEDICINE | Facility: CLINIC | Age: 53
End: 2023-05-09
Payer: OTHER GOVERNMENT

## 2023-05-09 NOTE — TELEPHONE ENCOUNTER
" Pt states "Can you order an CBC, ELMO and a CA marker please. Id also like an MRI of my back. I think I need iron infusions and possibly a B12 shot. Likely because of my RNY these may need to be on an ongoing basis. Ive been hurting so badly and having some tremors in my hands so I feel its more than changes because of age. Im also worried about osteoporosis. "   "

## 2023-05-16 DIAGNOSIS — M54.50 LUMBAR BACK PAIN: Primary | ICD-10-CM

## 2023-05-16 NOTE — PROGRESS NOTES
Please notify patient of lumbar xray results. There were mild degenerative changes and mils disc space narrowing. I would like to refer her to physical therapy to aid in her low back pain relief, I have placed the order today. Thank you.

## 2023-05-17 ENCOUNTER — PATIENT MESSAGE (OUTPATIENT)
Dept: PRIMARY CARE CLINIC | Facility: CLINIC | Age: 53
End: 2023-05-17
Payer: OTHER GOVERNMENT

## 2023-05-26 LAB — CRC RECOMMENDATION EXT: NORMAL

## 2023-05-29 ENCOUNTER — OFFICE VISIT (OUTPATIENT)
Dept: PRIMARY CARE CLINIC | Facility: CLINIC | Age: 53
End: 2023-05-29
Payer: OTHER GOVERNMENT

## 2023-05-29 VITALS
SYSTOLIC BLOOD PRESSURE: 116 MMHG | BODY MASS INDEX: 29.05 KG/M2 | TEMPERATURE: 98 F | WEIGHT: 153.88 LBS | OXYGEN SATURATION: 99 % | RESPIRATION RATE: 18 BRPM | HEART RATE: 58 BPM | HEIGHT: 61 IN | DIASTOLIC BLOOD PRESSURE: 68 MMHG

## 2023-05-29 DIAGNOSIS — Z98.84 HISTORY OF BARIATRIC SURGERY: ICD-10-CM

## 2023-05-29 DIAGNOSIS — Z09 FOLLOW-UP EXAM: Primary | ICD-10-CM

## 2023-05-29 DIAGNOSIS — D50.8 IRON DEFICIENCY ANEMIA SECONDARY TO INADEQUATE DIETARY IRON INTAKE: ICD-10-CM

## 2023-05-29 DIAGNOSIS — U07.1 COVID: ICD-10-CM

## 2023-05-29 PROCEDURE — 99214 OFFICE O/P EST MOD 30 MIN: CPT | Mod: S$GLB,,, | Performed by: FAMILY MEDICINE

## 2023-05-29 PROCEDURE — 99214 PR OFFICE/OUTPT VISIT, EST, LEVL IV, 30-39 MIN: ICD-10-PCS | Mod: S$GLB,,, | Performed by: FAMILY MEDICINE

## 2023-05-29 NOTE — Clinical Note
Patient states she underwent EGD and colonoscopy on Friday at [facility].

## 2023-05-30 ENCOUNTER — PATIENT OUTREACH (OUTPATIENT)
Dept: ADMINISTRATIVE | Facility: HOSPITAL | Age: 53
End: 2023-05-30
Payer: OTHER GOVERNMENT

## 2023-05-30 PROBLEM — D50.8 IRON DEFICIENCY ANEMIA SECONDARY TO INADEQUATE DIETARY IRON INTAKE: Status: ACTIVE | Noted: 2023-05-30

## 2023-05-30 PROBLEM — Z09 FOLLOW-UP EXAM: Status: ACTIVE | Noted: 2023-05-30

## 2023-05-30 NOTE — PROGRESS NOTES
Subjective:       Patient ID: Joselin Steinberg is a 53 y.o. female.    Chief Complaint: Follow-up (1 month)    53-year-old female presents to clinic for follow-up.  Patient states she tested COVID positive on Friday. Discussed concern for exposing other patients/staff in the office. Patients  is at bedside.  Requested her and her  please wear a mask.    Today, patient is requesting a iron infusion.  States she received them in the past with positive results.  Inquired if patient is taking any sort of vitamins after gastric bypass surgery.  Patient states she takes no supplements as the ones administered after her surgery were too large for her to take.  Patient states her period has been extremely heavy, she has been bleeding for over a week.  I encouraged her to follow-up with OBGYN.  Patient also states that her body has been hurting all over.  Patient states she has osteopenia, denies taking any calcium or vitamin-D supplements.  Patient states she received physical therapy referral but does not feel that will help with her back pain.  Patient purchased over-the-counter iron supplement, [employer] chelated iron.  Patient states she underwent EGD and colonoscopy on Friday at [facility].         Current Outpatient Medications:     albuterol (PROVENTIL/VENTOLIN HFA) 90 mcg/actuation inhaler, , Disp: , Rfl:     ascorbic acid, vitamin C, (VITAMIN C) 1000 MG tablet, Take 1,000 mg by mouth once daily., Disp: , Rfl:     blood sugar diagnostic Strp, To check BG 2 times daily, to use with insurance preferred meter, Disp: 200 strip, Rfl: 2    blood-glucose meter kit, To check BG 2 times daily, to use with insurance preferred meter, Disp: 1 each, Rfl: 0    lancets Misc, To check BG 2 times daily, to use with insurance preferred meter, Disp: 200 each, Rfl: 1    lysine 1,000 mg Tab, Take by mouth., Disp: , Rfl:     multivitamin with minerals tablet, , Disp: , Rfl:     QUERCETIN DIHYDRATE, BULK, MISC, by  Misc.(Non-Drug; Combo Route) route., Disp: , Rfl:     vitamin D (VITAMIN D3) 1000 units Tab, Take 1,000 Units by mouth once daily., Disp: , Rfl:     zinc acetate 25 mg (zinc) Cap, Take by mouth., Disp: , Rfl:     ferric carboxymaltose (INJECTAFER) 50 mg iron/mL injection, Inject 15 mLs (750 mg total) into the vein once. for 1 dose, Disp: 15 mL, Rfl: 0    Review of patient's allergies indicates:  No Known Allergies    Past Medical History:   Diagnosis Date    Asthma        Past Surgical History:   Procedure Laterality Date    BREAST BIOPSY       SECTION  1996    CHOLECYSTECTOMY      GASTRIC BYPASS  03/10/2010    TUBAL LIGATION  05/15/1996    Approximately       Family History   Problem Relation Age of Onset    Alcohol abuse Maternal Grandmother     Alcohol abuse Brother     Asthma Son     Diabetes Mother     Early death Mother     Lung cancer Mother     Drug abuse Brother        Social History     Tobacco Use    Smoking status: Never    Smokeless tobacco: Never   Substance Use Topics    Alcohol use: Never    Drug use: Never       Review of Systems   Constitutional:  Positive for fatigue. Negative for activity change, appetite change, fever and unexpected weight change.   HENT:  Negative for ear discharge, ear pain, hearing loss and tinnitus.    Eyes:  Negative for photophobia, pain and visual disturbance.   Respiratory:  Negative for cough, shortness of breath and wheezing.    Cardiovascular:  Negative for chest pain and palpitations.   Gastrointestinal:  Negative for abdominal pain, blood in stool, constipation, diarrhea, nausea and vomiting.   Genitourinary:  Negative for dysuria and hematuria.   Neurological:  Negative for weakness and headaches.       Current Medications:   Home Psychiatric Meds:   Psychotherapeutics (From admission, onward)      None                Objective:      Vitals:    23 0857   BP: 116/68   BP Location: Left arm   Patient Position: Sitting   BP Method: Medium (Manual)  "  Pulse: (!) 58   Resp: 18   Temp: 97.9 °F (36.6 °C)   TempSrc: Oral   SpO2: 99%   Weight: 69.8 kg (153 lb 14.4 oz)   Height: 5' 1" (1.549 m)     Physical Exam  Vitals reviewed.   Constitutional:       Appearance: Normal appearance.   HENT:      Head: Normocephalic.      Right Ear: Tympanic membrane, ear canal and external ear normal.      Left Ear: Tympanic membrane, ear canal and external ear normal.      Nose: Nose normal.      Mouth/Throat:      Mouth: Mucous membranes are moist.   Eyes:      Pupils: Pupils are equal, round, and reactive to light.   Cardiovascular:      Rate and Rhythm: Normal rate and regular rhythm.      Pulses: Normal pulses.      Heart sounds: Normal heart sounds.   Pulmonary:      Effort: Pulmonary effort is normal.      Breath sounds: Normal breath sounds.   Abdominal:      General: Bowel sounds are normal.      Palpations: Abdomen is soft.   Musculoskeletal:         General: Normal range of motion.      Cervical back: Normal range of motion and neck supple.   Skin:     General: Skin is warm and dry.   Neurological:      General: No focal deficit present.      Mental Status: She is alert and oriented to person, place, and time.   Psychiatric:         Mood and Affect: Mood normal.         Behavior: Behavior normal.         Lab Results   Component Value Date    WBC 2.38 (L) 01/10/2022    HGB 9.4 (L) 01/10/2022    HCT 32.7 (L) 01/10/2022     01/10/2022    CHOL 119 (L) 01/10/2022    TRIG 241 (H) 01/10/2022    HDL 28 (L) 01/10/2022    ALT 21 01/10/2022    AST 24 01/10/2022     01/10/2022    K 4.0 01/10/2022     01/10/2022    CREATININE 0.8 01/10/2022    BUN 7 01/10/2022    CO2 23 01/10/2022    TSH 2.736 01/10/2022    HGBA1C 5.5 01/10/2022      Assessment:       1. Follow-up exam    2. COVID    3. Iron deficiency anemia secondary to inadequate dietary iron intake    4. History of bariatric surgery        Plan:         Problem List Items Addressed This Visit          ID    " COVID     - symptomatic management  - COVID precautions            Oncology    Iron deficiency anemia secondary to inadequate dietary iron intake     - concern for compliance with previous supplement regimen post gastric bypass  - discussed beginning oral iron intake, 325mg PO Monday, Wednesday, Friday  - cautioned against AE including constipation with oral iron  - patient is adamant about having an iron infusion  - encouraged her to follow-up with OBGYN given nature and length of menses         Relevant Medications    ferric carboxymaltose (INJECTAFER) 50 mg iron/mL injection       Endocrine    History of bariatric surgery     - concern for patient not adhering to supplement regimen post bariatric surgery, states she wants to research supplements on her own before starting anything            Other    Follow-up exam - Primary     - reviewed all prior lab work with patient and answered questions to her complete satisfaction              Follow up in about 1 month (around 6/29/2023), or if symptoms worsen or fail to improve.        Approximately 30 minutes were spent on this encounter. This includes face to face time and non-face to face time preparing to see the patient (eg, review of tests), obtaining and/or reviewing separately obtained history, documenting clinical information in the electronic or other health record, independently interpreting results and communicating results to the patient/family/caregiver, or care coordinator.    Instructed patient that if symptoms fail to improve or worsen patient should seek immediate medical attention or report to the nearest emergency department. Patient expressed verbal agreement and understanding to this plan of care.     This note was created using [employer] voice recognition software that occasionally misinterpreted phrases or words.     JR Talbot MD

## 2023-05-30 NOTE — ASSESSMENT & PLAN NOTE
- concern for compliance with previous supplement regimen post gastric bypass  - discussed beginning oral iron intake, 325mg PO Monday, Wednesday, Friday  - cautioned against AE including constipation with oral iron  - patient is adamant about having an iron infusion  - encouraged her to follow-up with OBGYN given nature and length of menses

## 2023-05-30 NOTE — PROGRESS NOTES
Population Health Chart Review & Patient Outreach Details:     Reason for Outreach Encounter:     [x]  Non-Compliant Report   []  Payor Report ([employer], PHN, BCBS, MSSP, MCIP, UHC, etc.)   []  Pre-Visit Chart Review     Updates Requested / Reviewed:     [x]  Care Everywhere    [x]     []  External Sources ([facility], [facility], DIS, etc.)   [x]  Care Team Updated    Patient Outreach Method:    []  Telephone Outreach Completed   [] Successful   [] Left Voicemail   [] Unable to Contact (wrong number, no voicemail)  []  [employer] Portal Outreach Sent  []  Letter Outreach Mailed  []  Fax Sent for External Records  [x]  External Records Upload    Health Maintenance Topics Addressed and Outreach Outcomes / Actions Taken:        []      Breast Cancer Screening []  Mammo Scheduled      []  External Records Requested     []  Added Reminder to Complete to Upcoming Primary Care Appt Notes     []  Patient Declined     []  Patient Will Call Back to Schedule     []  Patient Will Schedule with External Provider / Order Routed if Applicable             []       Cervical Cancer Screening []  Pap Scheduled      []  External Records Requested     []  Added Reminder to Complete to Upcoming Primary Care Appt Notes     []  Patient Declined     []  Patient Will Call Back to Schedule     []  Patient Will Schedule with External Provider               [x]          Colorectal Cancer Screening []  Colonoscopy Case Request or Referral Placed     []  External Records Requested     []  Added Reminder to Complete to Upcoming Primary Care Appt Notes     []  Patient Declined     []  Patient Will Call Back to Schedule     []  Patient Will Schedule with External Provider     []  Fit Kit Mailed (add the SmartPhrase under additional notes)     []  Reminded Patient to Complete Home Test             []      Diabetic Eye Exam []  Eye Camera Scheduled or Optometry Referral Placed     []  External Records Requested     []  Added Reminder to Complete to  Upcoming Primary Care Appt Notes     []  Patient Declined     []  Patient Will Call Back to Schedule     []  Patient Will Schedule with External Provider             []      Blood Pressure Control []  Primary Care Follow Up Visit Scheduled     []  Remote Blood Pressure Reading Captured     []  Added Reminder to Complete to Upcoming Primary Care Appt Notes     []  Patient Declined     []  Patient Will Call Back / Patient Will Send Portal Message with Reading     []  Patient Will Call Back to Schedule Provider Visit             []       HbA1c & Other Labs []  Lab Appt Scheduled for Due Labs     []  Primary Care Follow Up Visit Scheduled      []  Reminded Patient to Complete Home Test     []  Added Reminder to Complete to Upcoming Primary Care Appt Notes     []  Patient Declined     []  Patient Will Call Back to Schedule     []  Patient Will Schedule with External Provider / Order Routed if Applicable           []    Schedule Primary Care Appt []  Primary Care Appt Scheduled     []  Patient Declined     []  Patient Will Call Back to Schedule     []  Pt Established with External Provider & Updated Care Team             []      Medication Adherence []  Primary Care Appointment Scheduled     []  Added Reminder to Upcoming Primary Care Appt Notes     []  Patient Reminded to  Prescription     []  Patient Declined, Provider Notified if Needed     []  Sent Provider Message to Review and/or Add Exclusion to Problem List             []      Osteoporosis Screening []  DXA Appointment Scheduled     []  External Records Requested     []  Added Reminder to Complete to Upcoming Primary Care Appt Notes     []  Patient Declined     []  Patient Will Call Back to Schedule     []  Patient Will Schedule with External Provider / Order Routed if Applicable     Additional Care Coordinator Notes:         Further Action Needed If Patient Returns Outreach:

## 2023-05-31 PROBLEM — Z98.84 HISTORY OF BARIATRIC SURGERY: Status: ACTIVE | Noted: 2023-05-31

## 2023-05-31 RX ORDER — FERRIC CARBOXYMALTOSE 50 MG/ML
750 INJECTION, SOLUTION INTRAVENOUS ONCE
Qty: 15 ML | Refills: 0 | OUTPATIENT
Start: 2023-05-31 | End: 2023-05-31

## 2023-05-31 RX ORDER — FERRIC CARBOXYMALTOSE 50 MG/ML
750 INJECTION, SOLUTION INTRAVENOUS ONCE
Qty: 15 ML | Refills: 0 | Status: SHIPPED | OUTPATIENT
Start: 2023-05-31 | End: 2023-05-31

## 2023-05-31 NOTE — ASSESSMENT & PLAN NOTE
- concern for patient not adhering to supplement regimen post bariatric surgery, states she wants to research supplements on her own before starting anything

## 2023-06-05 DIAGNOSIS — D50.8 IRON DEFICIENCY ANEMIA SECONDARY TO INADEQUATE DIETARY IRON INTAKE: Primary | ICD-10-CM

## 2023-06-05 RX ORDER — FERRIC CARBOXYMALTOSE 50 MG/ML
750 INJECTION, SOLUTION INTRAVENOUS ONCE
Qty: 15 ML | Refills: 0 | Status: SHIPPED | OUTPATIENT
Start: 2023-06-05 | End: 2023-06-05

## 2023-06-08 ENCOUNTER — TELEPHONE (OUTPATIENT)
Dept: PRIMARY CARE CLINIC | Facility: CLINIC | Age: 53
End: 2023-06-08
Payer: OTHER GOVERNMENT

## 2023-06-08 NOTE — TELEPHONE ENCOUNTER
----- Message from Lianna Mock sent at 6/8/2023  3:28 PM CDT -----  Contact: [facility]  Type: Needs Medical Advice  Who Called:  [facility]     Best Call Back Number: [phone]  Additional Information: [facility] infusion nurse states the order you all faxed over pt injection was faxed over to [facility] and she states she needs it faxed over to them in order for pt to get injection fax # [phone]. Please advise

## 2023-07-31 PROBLEM — Z13.820 ENCOUNTER FOR SCREENING FOR OSTEOPOROSIS: Status: RESOLVED | Noted: 2023-04-28 | Resolved: 2023-07-31

## 2023-09-04 PROBLEM — Z09 FOLLOW-UP EXAM: Status: RESOLVED | Noted: 2023-05-30 | Resolved: 2023-09-04
